# Patient Record
Sex: FEMALE | Race: WHITE | NOT HISPANIC OR LATINO | Employment: FULL TIME | URBAN - METROPOLITAN AREA
[De-identification: names, ages, dates, MRNs, and addresses within clinical notes are randomized per-mention and may not be internally consistent; named-entity substitution may affect disease eponyms.]

---

## 2020-07-02 ENCOUNTER — EH NON-PROVIDER (OUTPATIENT)
Dept: OCCUPATIONAL MEDICINE | Facility: CLINIC | Age: 50
End: 2020-07-02

## 2020-07-02 ENCOUNTER — HOSPITAL ENCOUNTER (OUTPATIENT)
Facility: MEDICAL CENTER | Age: 50
End: 2020-07-02
Attending: NURSE PRACTITIONER
Payer: COMMERCIAL

## 2020-07-02 ENCOUNTER — EMPLOYEE HEALTH (OUTPATIENT)
Dept: OCCUPATIONAL MEDICINE | Facility: CLINIC | Age: 50
End: 2020-07-02

## 2020-07-02 DIAGNOSIS — Z02.89 VISIT FOR OCCUPATIONAL HEALTH EXAMINATION: Primary | ICD-10-CM

## 2020-07-02 DIAGNOSIS — Z02.89 VISIT FOR OCCUPATIONAL HEALTH EXAMINATION: ICD-10-CM

## 2020-07-02 DIAGNOSIS — Z02.1 PRE-EMPLOYMENT HEALTH SCREENING EXAMINATION: ICD-10-CM

## 2020-07-02 LAB
AMP AMPHETAMINE: NORMAL
BAR BARBITURATES: NORMAL
BZO BENZODIAZEPINES: NORMAL
COC COCAINE: NORMAL
INT CON NEG: NORMAL
INT CON POS: NORMAL
MDMA ECSTASY: NORMAL
MET METHAMPHETAMINES: NORMAL
MTD METHADONE: NORMAL
OPI OPIATES: NORMAL
OXY OXYCODONE: NORMAL
PCP PHENCYCLIDINE: NORMAL
POC URINE DRUG SCREEN OCDRS: NORMAL
THC: NORMAL

## 2020-07-02 PROCEDURE — 94375 RESPIRATORY FLOW VOLUME LOOP: CPT | Performed by: NURSE PRACTITIONER

## 2020-07-02 PROCEDURE — 86735 MUMPS ANTIBODY: CPT | Performed by: NURSE PRACTITIONER

## 2020-07-02 PROCEDURE — 86480 TB TEST CELL IMMUN MEASURE: CPT | Performed by: NURSE PRACTITIONER

## 2020-07-02 PROCEDURE — 86762 RUBELLA ANTIBODY: CPT | Performed by: NURSE PRACTITIONER

## 2020-07-02 PROCEDURE — 86787 VARICELLA-ZOSTER ANTIBODY: CPT | Performed by: NURSE PRACTITIONER

## 2020-07-02 PROCEDURE — 86765 RUBEOLA ANTIBODY: CPT | Performed by: NURSE PRACTITIONER

## 2020-07-02 PROCEDURE — 80305 DRUG TEST PRSMV DIR OPT OBS: CPT | Performed by: NURSE PRACTITIONER

## 2020-07-02 PROCEDURE — 8915 PR COMPREHENSIVE PHYSICAL: Performed by: NURSE PRACTITIONER

## 2020-07-03 LAB
MEV IGG SER IA-ACNC: 0.76
MUV IGG SER IA-ACNC: 1.05
RUBV AB SER QL: 65.2 IU/ML
VZV IGG SER IA-ACNC: 0.84

## 2020-07-06 ENCOUNTER — TELEPHONE (OUTPATIENT)
Dept: OCCUPATIONAL MEDICINE | Facility: CLINIC | Age: 50
End: 2020-07-06

## 2020-07-06 LAB
GAMMA INTERFERON BACKGROUND BLD IA-ACNC: 0.63 IU/ML
M TB IFN-G BLD-IMP: NEGATIVE
M TB IFN-G CD4+ BCKGRND COR BLD-ACNC: 0.17 IU/ML
MITOGEN IGNF BCKGRD COR BLD-ACNC: >10 IU/ML
QFT TB2 - NIL TBQ2: -0.02 IU/ML

## 2020-07-06 NOTE — TELEPHONE ENCOUNTER
Phone Number Called: [phone] (home)       Call outcome: Left detailed message for patient. Informed to call back with any additional questions.    Message: patient's tb is still pending results as of right now. Patients VZV was non immune at this time and will be needing the vaccines to complete the process.

## 2020-07-06 NOTE — TELEPHONE ENCOUNTER
Phone Number Called: [phone] (home)       Call outcome: Spoke to patient regarding message below.    Message:    Patient was non immune for her varicella vaccine & is still in need for her Tdap vaccine. I spoke with the patient first thing this morning and she wanted the information on the vaccines itself because HR stated that she can get that information from us. I was able to call her back and let her know that when I tried to send the vaccine information it was not clear and that she can come in and I can let her take a look at the information sheets. That way she can decided how she would like to proceed. She stated that can go ahead and come in for her vaccines tomorrow an apt was set up for her and she stated that she would actually like to get the vaccines on two different days. Her start date is 07/13/2020. I stated to her that I can not guarantee that at this time. But I can go ahead and ask. The apt was left for just the Tdap at this time and I told her I would call her back with the final result. I as able to speak with our provider and she stated that she would ideally like to get them both done at the same time and I did let our supervisor know and she stated that we would follow what the provider says. When I called the patient back to let her know she stated that we did not care about what we were putting in her body and that she would not get a regular reaction but it would causes issues internally. I did let her know that I can not force her to get anything that she would like but I would need to let HR know that she is officially declining the vaccines. She was really pushing to get both vaccines done on different days and I asked her if I can put her on hold while I spoke with the provider and supervisor. Our supervisor let me know that she will take over at this point and bring this up to infection prevention. This was told to the patient and she said this was why she did not want me to go  back and forth asking questions because she would look like she is being difficult. The reason to her wanting to get the vaccines done on different days was for her to be able to take some kinf of cleanse in between vaccines. She also asked me if there was a risk if she no longer had her job and I did let her know that was out of my scope and I could not answer that question at this time. She asked if she still needed to come in tomorrow for her apt and I told her that it was best if she waited for my supervisors phone call tomorrow because she would have options for her.

## 2020-07-07 ENCOUNTER — EH NON-PROVIDER (OUTPATIENT)
Dept: OCCUPATIONAL MEDICINE | Facility: CLINIC | Age: 50
End: 2020-07-07

## 2020-07-07 DIAGNOSIS — Z23 NEED FOR VACCINATION: ICD-10-CM

## 2020-07-07 PROCEDURE — 90715 TDAP VACCINE 7 YRS/> IM: CPT | Performed by: NURSE PRACTITIONER

## 2020-07-07 PROCEDURE — 90716 VAR VACCINE LIVE SUBQ: CPT | Performed by: NURSE PRACTITIONER

## 2020-07-07 NOTE — NON-PROVIDER
Patient wasn't too sure about  getting the vaccines, but said she needed too, so she can get the job. I went over the vaccine Questionnaire where then she had answered yes to the latex allegry and that she had reacted to vaccines in the past. She would specify at first but when I had asked what had happened before, she said she would get flu like symptoms. She went on about how she feels that companies shouldn't make the employees get vaccines and that they should be concerned about their own well being. She stated that she would make a change once she stared working here as an employee. She tolerated the vaccines and asked to stay a little longer here since she was feeling a little concerned. She then left after five minutes after receiving them. She was given water

## 2020-07-08 ENCOUNTER — EH NON-PROVIDER (OUTPATIENT)
Dept: OCCUPATIONAL MEDICINE | Facility: CLINIC | Age: 50
End: 2020-07-08

## 2020-07-28 ENCOUNTER — OCCUPATIONAL MEDICINE (OUTPATIENT)
Dept: URGENT CARE | Facility: CLINIC | Age: 50
End: 2020-07-28
Payer: COMMERCIAL

## 2020-07-28 VITALS
SYSTOLIC BLOOD PRESSURE: 124 MMHG | DIASTOLIC BLOOD PRESSURE: 74 MMHG | TEMPERATURE: 97.4 F | RESPIRATION RATE: 16 BRPM | HEART RATE: 72 BPM | OXYGEN SATURATION: 98 %

## 2020-07-28 DIAGNOSIS — M25.50 ARTHRALGIA, UNSPECIFIED JOINT: ICD-10-CM

## 2020-07-28 DIAGNOSIS — T50.Z95A VACCINE REACTION, INITIAL ENCOUNTER: ICD-10-CM

## 2020-07-28 LAB
AMP AMPHETAMINE: NORMAL
BAR BARBITURATES: NORMAL
BREATH ALCOHOL COMMENT: NORMAL
BZO BENZODIAZEPINES: NORMAL
COC COCAINE: NORMAL
INT CON NEG: NORMAL
INT CON POS: NORMAL
MDMA ECSTASY: NORMAL
MET METHAMPHETAMINES: NORMAL
MTD METHADONE: NORMAL
OPI OPIATES: NORMAL
OXY OXYCODONE: NORMAL
PCP PHENCYCLIDINE: NORMAL
POC BREATHALIZER: 0 PERCENT (ref 0–0.01)
POC URINE DRUG SCREEN OCDRS: NORMAL
THC: NORMAL

## 2020-07-28 PROCEDURE — 80305 DRUG TEST PRSMV DIR OPT OBS: CPT | Mod: 29 | Performed by: NURSE PRACTITIONER

## 2020-07-28 PROCEDURE — 82075 ASSAY OF BREATH ETHANOL: CPT | Mod: 29 | Performed by: NURSE PRACTITIONER

## 2020-07-28 PROCEDURE — 99203 OFFICE O/P NEW LOW 30 MIN: CPT | Mod: 29 | Performed by: NURSE PRACTITIONER

## 2020-07-28 ASSESSMENT — VISUAL ACUITY: OU: 1

## 2020-07-28 ASSESSMENT — ENCOUNTER SYMPTOMS
NEUROLOGICAL NEGATIVE: 1
CONSTITUTIONAL NEGATIVE: 1
FEVER: 0

## 2020-07-28 NOTE — LETTER
[place] [address] [facility] [address] [place] [address]  Phone:  [phone] - Fax:  [phone]   [facility] Progress Report and Disability Certification  Date of Service: 7/28/2020   No Show:  No  Date / Time of Next Visit: 8/4/2020 @ 8:30AM @[facility]    Claim Information   Patient Name: Dana Paniagua  Claim Number:     Employer: [employer]  Date of Injury: 7/7/2020     Insurer / TPA: Workers Choice  ID / SSN:     Occupation: CNA  Diagnosis: Diagnoses of Vaccine reaction, initial encounter and Arthralgia, unspecified joint were pertinent to this visit.    Medical Information   Related to Industrial Injury? Yes    Subjective Complaints:  DOI 7/7/2020. Initial visit.  Patient received MMR and varicella vaccines at her pre-employment physical as a condition for her new job.  After receiving the vaccines, she is experiencing gradually worsening joint pain in both of her knees, her left elbow, and her right wrist.  Also starting to have joint pain in her back.  Walking, standing, and physical activity is difficult due to the pain.  Has previous known allergens to latex, gluten, mercury, and penicillins.   Objective Findings:  Constitutional:       General: She is not in acute distress.     Appearance: She is well-developed. She is not ill-appearing or toxic-appearing.   HENT:      Head: Normocephalic.      Right Ear: External ear normal.      Left Ear: External ear normal.      Nose: Nose normal.   Eyes:      General: Vision grossly intact.      Extraocular Movements: Extraocular movements intact.      Conjunctiva/sclera: Conjunctivae normal.   Neck:      Musculoskeletal: Normal range of motion.   Cardiovascular:      Rate and Rhythm: Normal rate.   Pulmonary:      Effort: Pulmonary effort is normal. No respiratory distress.   Musculoskeletal: Normal range of motion.         General: No deformity.      Left elbow: She exhibits normal range of motion, no swelling  and no deformity. No tenderness found.      Right knee: She exhibits swelling. She exhibits normal range of motion, no ecchymosis, no deformity, no laceration and normal alignment. Tenderness found.      Left knee: She exhibits swelling. She exhibits normal range of motion, no ecchymosis, no deformity, no laceration and normal alignment. Tenderness found.   Skin:     General: Skin is warm and dry.      Coloration: Skin is not pale.      Findings: Bruising (Mild, at right upper arm from vaccine injection) present.   Neurological:      Mental Status: She is alert and oriented to person, place, and time.      Motor: No weakness.      Coordination: Coordination normal.   Psychiatric:         Behavior: Behavior normal. Behavior is cooperative.   Pre-Existing Condition(s):     Assessment:   Initial Visit    Status: Additional Care Required  Permanent Disability:No    Plan:      Diagnostics:      Comments:  Released to restricted duty.  Follow-up in 1 week.  May continue with over-the-counter ibuprofen as needed for pain.  Return sooner if symptoms change or worsen.    Disability Information   Status: Released to Restricted Duty    From:  2020  Through: 2020 Restrictions are: Temporary   Physical Restrictions   Sitting:    Standin hrs/day Stoopin hrs/day Bendin hrs/day   Squattin hrs/day Walkin hrs/day Climbing:    Pushin hrs/day   Pullin hrs/day Other:    Reaching Above Shoulder (L):   Reaching Above Shoulder (R):       Reaching Below Shoulder (L):    Reaching Below Shoulder (R):      Not to exceed Weight Limits   Carrying(hrs):   Weight Limit(lb): < or = to 10 pounds Lifting(hrs):   Weight  Limit(lb): < or = to 10 pounds   Comments:      Repetitive Actions   Hands: i.e. Fine Manipulations from Grasping: < or = to 1 hr/day   Feet: i.e. Operating Foot Controls:     Driving / Operate Machinery:     Provider Name:   AVERY Wiggins Physician Signature:  Physician  Name:     Clinic Name / Location: [address]  [place] [address] Clinic Phone Number: Dept: [phone]   Appointment Time: 9:30 Am Visit Start Time: 10:21 AM   Check-In Time:  9:36 Am Visit Discharge Time:  11:12 AM   Original-Treating Physician or Chiropractor    Page 2-Insurer/TPA    Page 3-Employer    Page 4-Employee

## 2020-07-28 NOTE — LETTER
EMPLOYEE’S CLAIM FOR COMPENSATION/ REPORT OF INITIAL TREATMENT  FORM C-4    EMPLOYEE’S CLAIM - PROVIDE ALL INFORMATION REQUESTED   First Name  Dana Last Name  Arcelia Birthdate                    1970                Sex  female Claim Number   Home Address  [address] Age  49 y.o. 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 (if applicable)  Being Vaccinacted    How did this injury or occupational disease occur? (Be specific an answer in detail. Use additional sheet if necessary)  I was at my employment physical and had the MMR and Varcilla vaccines   If you believe that you have an occupational disease, when did you first have knowledge of the disability and it relationship to your employment?  na Witnesses to the Accident  Yamila      Nature of Injury or Occupational Disease  Inflammation  Part(s) of Body Injured or Affected  Soft Tissue, ,     I certify that the above is true and correct to the best of my knowledge and that I have provided this information in order to obtain the benefits of [place]’s [place] or [place]).  I hereby authorize any physician, chiropractor, surgeon, practitioner, or other person, any hospital, including [facility], any medical service organization, any insurance company, or other institution or organization to release to each other, any medical or other information, including benefits paid or payable, pertinent to this injury or disease, except information relative to diagnosis, treatment and/or counseling for AIDS, psychological conditions, alcohol or controlled substances, for which I must give specific authorization.  A Photostat of this authorization shall be as valid as the original.     Date   Place   Employee’s Signature   THIS REPORT MUST BE COMPLETED AND MAILED WITHIN 3 WORKING DAYS OF TREATMENT   Place  [facility]  Name of [facility]   Date  7/28/2020 Diagnosis  (T50.Z95A) Vaccine reaction, initial encounter  (M25.50) Arthralgia, unspecified joint Is there evidence the injured employee was under the              influence of alcohol and/or another controlled substance at the time of accident?   Hour  10:21 AM Description of Injury or Disease  Diagnoses of Vaccine reaction, initial encounter and Arthralgia, unspecified joint were pertinent to this visit. No   Treatment  Released to restricted duty.  Follow-up in 1 week.  May continue with over-the-counter ibuprofen as needed for pain.  Return sooner if symptoms change or worsen.  Have you advised the patient to remain off work five days or     more? No   X-Ray Findings      If Yes   From Date  To Date      From information given by the employee, together with medical evidence, can you directly connect this injury or occupational disease as job incurred?  Yes If No Full Duty    No Modified Duty  Kendy   Is additional medical care by a physician indicated?  Yes If Modified Duty, Specify any Limitations / Restrictions  Per D39   Do you know of  "any previous injury or disease contributing to this condition or occupational disease?                            No   Date  7/28/2020 Print Doctor’s Name   AVERY Wiggins I certify the employer’s copy of  this form was mailed on:   Address  [address] Insurer’s Use Only     [place]  [address]    Provider’s Tax ID Number  [id number] Telephone  Dept: [phone]      e-DESEAN Pina  Signature:     Degree          ORIGINAL-TREATING PHYSICIAN OR CHIROPRACTOR    PAGE 2-INSURER/TPA    PAGE 3-EMPLOYER    PAGE 4-EMPLOYEE        Form C-4 (rev.10/07)          BRIEF DESCRIPTION OF RIGHTS AND BENEFITS  (Pursuant to [place]RS 616C.050)    Notice of Injury or Occupational Disease (Incident Report Form C-1): If an injury or occupational disease (OD) arises out of and in the course of employment, you must provide written notice to your employer as soon as practicable, but no later than 7 days after the accident or OD. Your employer shall maintain a sufficient supply of the required forms.     Claim for Compensation (Form C-4): If medical treatment is sought, the form C-4 is available at the place of initial treatment. A completed \"Claim for Compensation\" (Form C-4) must be filed within 90 days after an accident or OD. The treating physician or chiropractor must, within 3 working days after treatment, complete and mail to the employer, the employer's insurer and third-party , the Claim for Compensation.     Medical Treatment: If you require medical treatment for your on-the-job injury or OD, you may be required to select a physician or chiropractor from a list provided by your workers’ compensation insurer, if it has contracted with an Organization for Managed Care (MCO) or Preferred Provider Organization (PPO) or providers of health care. If your employer has not entered into a contract with an MCO or PPO, you may select a physician or chiropractor from the " Panel of Physicians and Chiropractors. Any medical costs related to your industrial injury or OD will be paid by your insurer.     Temporary Total Disability (TTD): If your doctor has certified that you are unable to work for a period of at least 5 consecutive days, or 5 cumulative days in a 20-day period, or places restrictions on you that your employer does not accommodate, you may be entitled to TTD compensation.     Temporary Partial Disability (TPD): If the wage you receive upon reemployment is less than the compensation for TTD to which you are entitled, the insurer may be required to pay you TPD compensation to make up the difference. TPD can only be paid for a maximum of 24 months.     Permanent Partial Disability (PPD): When your medical condition is stable and there is an indication of a PPD as a result of your injury or OD, within 30 days, your insurer must arrange for an evaluation by a rating physician or chiropractor to determine the degree of your PPD. The amount of your PPD award depends on the date of injury, the results of the PPD evaluation and your age and wage.     Permanent Total Disability (PTD): If you are medically certified by a treating physician or chiropractor as permanently and totally disabled and have been granted a PTD status by your insurer, you are entitled to receive monthly benefits not to exceed 66 2/3% of your average monthly wage. The amount of your PTD payments is subject to reduction if you previously received a PPD award.     Vocational Rehabilitation Services: You may be eligible for vocational rehabilitation services if you are unable to return to the job due to a permanent physical impairment or permanent restrictions as a result of your injury or occupational disease.     Transportation and Per Emily Reimbursement: You may be eligible for travel expenses and per emily associated with medical treatment.     Reopening: You may be able to reopen your claim if your condition  worsens after claim closure.     Appeal Process: If you disagree with a written determination issued by the insurer or the insurer does not respond to your request, you may appeal to the Department of Administration, , by following the instructions contained in your determination letter. You must appeal the determination within 70 days from the date of the determination letter at [address], [address], [place], [place] [address], or [address], [place], [place] [address]. If you disagree with the  decision, you may appeal to the Department of Administration, . You must file your appeal within 30 days from the date of the  decision letter at [address], [address], [place], [place] [address], or [address] [facility] [address], [place], [place] [address]. If you disagree with a decision of an , you may file a petition for judicial review with the District Court. You must do so within 30 days of the Appeal Officer’s decision. You may be represented by an  at your own expense or you may contact the [facility] for possible representation.     [place]  for Injured Workers (NAIW): If you disagree with a  decision, you may request that NAIW represent you without charge at an  Hearing. For information regarding denial of benefits, you may contact the [facility] at: [address], [address], [place] [address], [phone], or [address] [facility] [address], [place] [address], [phone]     To File a Complaint with the Division: If you wish to file a complaint with the  of the Division of Industrial Relations (DIR), please contact the Workers’ Compensation Section, [address], [facility] [address], [place], [place] [address], telephone (564) [phone], or [address], [place], [place] [address], telephone [phone].     For  assistance with Workers’ Compensation Issues: You may contact the Office of the Governor Consumer Health Assistance, [address] [place] [address], [place] [address], [place] [address], Toll Free 1-946.294.4973, Web site: http://govcha.[facility].nv., E-mail [email or web address].              __________________________________________________________________                              ___________________         Employee Name / Signature                                                                                                                     Date                                                                                                                                                                                       D-2 (rev. 01/20)

## 2020-07-28 NOTE — PROGRESS NOTES
Subjective:     Dana Paniagua is a 49 y.o. female who presents for Other (NEW  DOI 7/7/2020 vaccine reaction)    DOI 7/7/2020. Initial visit.  Patient received MMR and varicella vaccines at her pre-employment physical as a condition for her new job.  After receiving the vaccines, she is experiencing gradually worsening joint pain in both of her knees, her left elbow, and her right wrist.  Also starting to have joint pain in her back.  Walking, standing, and physical activity is difficult due to the pain.  Has previous known allergens to latex, gluten, mercury, and penicillins.    Denies previous known history of arthritis.    Patient was screened prior to rooming and denied COVID-19 diagnosis or contact with a person who has been diagnosed or is suspected to have COVID-19. During this visit, appropriate PPE was worn, hand hygiene was performed, and the patient and any visitors were masked.    PMH: No pertinent past medical history to this problem  MEDS: Medications were reviewed in Epic  ALLERGIES: Allergies were reviewed in Epic  SOCHX: Works as a CNA   FH: No pertinent family history to this problem    Review of Systems   Constitutional: Negative.  Negative for fever.   Musculoskeletal: Positive for joint pain.   Neurological: Negative.    All other systems reviewed and are negative.    Additional details per HPI.      Objective:     /74 (BP Location: Right arm, Patient Position: Sitting, BP Cuff Size: Adult)   Pulse 72   Temp 36.3 °C (97.4 °F) (Temporal)   Resp 16   SpO2 98%     Physical Exam  Vitals signs reviewed.   Constitutional:       General: She is not in acute distress.     Appearance: She is well-developed. She is not ill-appearing or toxic-appearing.   HENT:      Head: Normocephalic.      Right Ear: External ear normal.      Left Ear: External ear normal.      Nose: Nose normal.   Eyes:      General: Vision grossly intact.      Extraocular Movements: Extraocular movements intact.       Conjunctiva/sclera: Conjunctivae normal.   Neck:      Musculoskeletal: Normal range of motion.   Cardiovascular:      Rate and Rhythm: Normal rate.   Pulmonary:      Effort: Pulmonary effort is normal. No respiratory distress.   Musculoskeletal: Normal range of motion.         General: No deformity.      Left elbow: She exhibits normal range of motion, no swelling and no deformity. No tenderness found.      Right knee: She exhibits swelling. She exhibits normal range of motion, no ecchymosis, no deformity, no laceration and normal alignment. Tenderness found.      Left knee: She exhibits swelling. She exhibits normal range of motion, no ecchymosis, no deformity, no laceration and normal alignment. Tenderness found.   Skin:     General: Skin is warm and dry.      Coloration: Skin is not pale.      Findings: Bruising (Mild, at right upper arm from vaccine injection) present.   Neurological:      Mental Status: She is alert and oriented to person, place, and time.      Motor: No weakness.      Coordination: Coordination normal.   Psychiatric:         Behavior: Behavior normal. Behavior is cooperative.       Assessment/Plan:     1. Vaccine reaction, initial encounter  - POCT Breath Alcohol Test  - POCT 11 Panel Urine Drug Screen    2. Arthralgia, unspecified joint  - POCT Breath Alcohol Test  - POCT 11 Panel Urine Drug Screen      Likely vaccine reaction to MMR and/or varicella. Watchful waiting for resolution. Vital signs stable, afebrile, asymptomatic, no acute distress at this time. Patient concerned of follow-up vaccines and wants to avoid further shots. Allergy list updated.    Released to restricted duty.  Follow-up in 1 week.  May continue with over-the-counter ibuprofen as needed for pain.  Return sooner if symptoms change or worsen.    Differential diagnosis, natural history, supportive care, over-the-counter symptom management per 's instructions, close monitoring, and indications for immediate  follow-up discussed.     Patient advised to: Return in about 1 week (around 8/4/2020).    All questions answered. Patient agrees with the plan of care.

## 2020-08-04 ENCOUNTER — OCCUPATIONAL MEDICINE (OUTPATIENT)
Dept: OCCUPATIONAL MEDICINE | Facility: CLINIC | Age: 50
End: 2020-08-04
Payer: COMMERCIAL

## 2020-08-04 ENCOUNTER — APPOINTMENT (OUTPATIENT)
Dept: RADIOLOGY | Facility: IMAGING CENTER | Age: 50
End: 2020-08-04
Attending: PREVENTIVE MEDICINE
Payer: COMMERCIAL

## 2020-08-04 VITALS
HEIGHT: 63 IN | DIASTOLIC BLOOD PRESSURE: 70 MMHG | OXYGEN SATURATION: 98 % | BODY MASS INDEX: 27.82 KG/M2 | TEMPERATURE: 97.4 F | RESPIRATION RATE: 12 BRPM | HEART RATE: 77 BPM | WEIGHT: 157 LBS | SYSTOLIC BLOOD PRESSURE: 122 MMHG

## 2020-08-04 DIAGNOSIS — M25.50 ARTHRALGIA, UNSPECIFIED JOINT: ICD-10-CM

## 2020-08-04 PROCEDURE — 73070 X-RAY EXAM OF ELBOW: CPT | Mod: TC,RT | Performed by: PREVENTIVE MEDICINE

## 2020-08-04 PROCEDURE — 73562 X-RAY EXAM OF KNEE 3: CPT | Mod: TC,LT | Performed by: PREVENTIVE MEDICINE

## 2020-08-04 PROCEDURE — 99202 OFFICE O/P NEW SF 15 MIN: CPT | Performed by: PREVENTIVE MEDICINE

## 2020-08-04 NOTE — PROGRESS NOTES
"Subjective:     Dana Paniagua is a 49 y.o. female who presents for Other (#16)      DOI 7/24/2020: 48 yo female presents for joint pain after vaccine administration. During pre-employment evaluation patient had equivocal titers for Varicella and needed updated Tdap.  Having documentation of these as part of the hospital employment condition, so she received both vaccinations.  She states after about 4 days she started getting pain in her right knee and subsequently had pain extend to the left knee and elbows and into the back.  She states at one point is very difficult to walk and was in extreme pain.  She has done different ozone and holistic medicine things to help reduce symptoms.  She was evaluated in the urgent care last week and put on work restrictions.  She states that currently symptoms are a little bit improved but continues to have \"inflammation\" especially in the right knee.  She states she is able to walk about a block at this point.  She did note that she had mild fever/chills at the beginning but this subsequently resolved with her holistic treatments.  Her main concern at this point is the persistent joint pain which is been almost going on 1 month.    ROS: All systems were reviewed on intake form, form was reviewed and signed. See scanned documents in media. Pertinent positives and negatives included in HPI.    PMH: No pertinent past medical history to this problem  MEDS: Medications were reviewed in Epic  ALLERGIES:   Allergies   Allergen Reactions   • Latex    • Measles & Rubella Vaccines    • Mercury    • Mumps Virus Vaccine Live    • Penicillins    • Varicella Virus Vaccine Live      SOCHX: Works asa CNA at [employer]  FH: No pertinent family history to this problem       Objective:     /70   Pulse 77   Temp 36.3 °C (97.4 °F)   Resp 12   Ht 1.6 m (5' 3\")   Wt 71.2 kg (157 lb)   SpO2 98%   BMI 27.81 kg/m²     Constitutional: Patient is in no acute distress. Appears well-developed and " well-nourished.   HENT: Normocephalic and atraumatic. EOM are normal. No scleral icterus.   Cardiovascular: Normal rate.    Pulmonary/Chest: Effort normal. No respiratory distress.   Neurological: Patient is alert and oriented to person, place, and time.   Skin: Skin is warm and dry.   Psychiatric: Normal mood and affect. Behavior is normal.     X-rays of the left and right elbow: No acute deformity.  No joint effusion/swelling.  No arthritis or other deformity seen  X-rays of the left and right knees: No acute deformity.  No joint effusion.  No arthritis.     Assessment/Plan:       1. Arthralgia, unspecified joint  - DX-KNEE 3 VIEWS RIGHT; Future  - DX-KNEE 3 VIEWS LEFT; Future  - DX-ELBOW-LIMITED 2- LEFT; Future  - DX-ELBOW-LIMITED 2- RIGHT; Future    • Discussed x-ray findings.  No evidence of arthritis or inflammatory arthropathy.  Reassured patient that the symptoms should be temporary and should gradually resolve on their own.  X-rays did not show any permanent arthropathy or bony issues.  • Given  •  that patient was equivocal on varicella titer it can be presumed that she had antibodies from childhood and that one varicella vaccine should be sufficient for immune response.  • Recommend OTC ibuprofen/naproxen for pain  • Gradually increase walking a  • s tolerated  • Restricted duty for 1 week then may return to full duty starting Monday, August 10.  • Released from care  •     Differential diagnosis, natural history, supportive care, and indications for immediate follow-up discussed.

## 2020-08-04 NOTE — LETTER
"   [facility] [place]  [address] [place] [address],   [address] [address]  Phone:  [phone] - Fax:  [phone]   Occupational Health [facility] Progress Report and Disability Certification  Date of Service: 8/4/2020   No Show:  No  Date / Time of Next Visit:  Release from care   Claim Information   Patient Name: Dana Paniagua  Claim Number:     Employer: [employer]  Date of Injury: 7/7/2020     Insurer / TPA: [employer]  ID / SSN:     Occupation: CNA  Diagnosis: The encounter diagnosis was Arthralgia, unspecified joint.    Medical Information   Related to Industrial Injury?   Comments:Indeterminant-symptoms were severe and longer duration than typical vaccine reaction.    Subjective Complaints:  DOI 7/24/2020: 50 yo female presents for joint pain after vaccine administration. During pre-employment evaluation patient had equivocal titers for Varicella and needed updated Tdap.  Having documentation of these as part of the hospital employment condition, so she received both vaccinations.  She states after about 4 days she started getting pain in her right knee and subsequently had pain extend to the left knee and elbows and into the back.  She states at one point is very difficult to walk and was in extreme pain.  She has done different ozone and holistic medicine things to help reduce symptoms.  She was evaluated in the urgent care last week and put on work restrictions.  She states that currently symptoms are a little bit improved but continues to have \"inflammation\" especially in the right knee.  She states she is able to walk about a block at this point.  She did note that she had mild fever/chills at the beginning but this subsequently resolved with her holistic treatments.  Her main concern at this point is the persistent joint pain which is been almost going on 1 month.   Objective Findings: X-rays of the left and right elbow: No acute deformity.  No joint effusion/swelling.  " No arthritis or other deformity seen  X-rays of the left and right knees: No acute deformity.  No joint effusion.  No arthritis.    Pre-Existing Condition(s):     Assessment:   Condition Same    Status: Discharged /  MMI  Permanent Disability:No    Plan:      Diagnostics:      Comments:  Discussed x-ray findings.  No evidence of arthritis or inflammatory arthropathy.  Reassured patient that the symptoms should be temporary and should gradually resolve on their own.  X-rays did not show any permanent arthropathy or bony issues.  Given   that patient was equivocal on varicella titer it can be presumed that she had antibodies from childhood and that one varicella vaccine should be sufficient for immune response.  Recommend OTC ibuprofen/naproxen for pain  Gradually increase walking a  s tolerated  Restricted duty for 1 week then may return to full duty starting Monday, August 10.  Released from care      Disability Information   Status:   Comments:Restrictions in place until August 10, may begin full duty on August 10.    From:  8/4/2020  Through:   Restrictions are: Temporary   Physical Restrictions   Sitting:    Standing:  < or = to 2 hrs/day Stooping:    Bending:      Squatting:    Walking:  < or = to 2 hrs/day Climbing:    Pushing:      Pulling:    Other:    Reaching Above Shoulder (L):   Reaching Above Shoulder (R):       Reaching Below Shoulder (L):    Reaching Below Shoulder (R):      Not to exceed Weight Limits   Carrying(hrs):   Weight Limit(lb): < or = to 25 pounds Lifting(hrs):   Weight  Limit(lb): < or = to 25 pounds   Comments: Seated work 75% of shift.  Allowed to stand/walk as needed for comfort.  These restrictions are in place until Monday, August 10 at which time she may begin full duty.    Repetitive Actions   Hands: i.e. Fine Manipulations from Grasping:     Feet: i.e. Operating Foot Controls:     Driving / Operate Machinery:     Provider Name:   Gabe Thomas D.O. Physician Signature:  Physician  Name:     Clinic Name / Location: [address],   [address]  [place] [address] Clinic Phone Number: Dept: [phone]   Appointment Time: 8:30 Am Visit Start Time: 8:34 AM   Check-In Time:  8:30 Am Visit Discharge Time:  10:04 AM   Original-Treating Physician or Chiropractor    Page 2-Insurer/TPA    Page 3-Employer    Page 4-Employee

## 2020-10-14 ENCOUNTER — HOSPITAL ENCOUNTER (OUTPATIENT)
Facility: MEDICAL CENTER | Age: 50
End: 2020-10-14
Attending: NURSE PRACTITIONER
Payer: COMMERCIAL

## 2020-10-14 ENCOUNTER — EH NON-PROVIDER (OUTPATIENT)
Dept: OCCUPATIONAL MEDICINE | Facility: CLINIC | Age: 50
End: 2020-10-14

## 2020-10-14 DIAGNOSIS — Z11.59 SCREENING FOR VIRAL DISEASE: ICD-10-CM

## 2020-10-14 PROCEDURE — U0003 INFECTIOUS AGENT DETECTION BY NUCLEIC ACID (DNA OR RNA); SEVERE ACUTE RESPIRATORY SYNDROME CORONAVIRUS 2 (SARS-COV-2) (CORONAVIRUS DISEASE [COVID-19]), AMPLIFIED PROBE TECHNIQUE, MAKING USE OF HIGH THROUGHPUT TECHNOLOGIES AS DESCRIBED BY CMS-2020-01-R: HCPCS | Performed by: PREVENTIVE MEDICINE

## 2020-10-15 LAB
COVID ORDER STATUS COVID19: NORMAL
SARS-COV-2 RNA RESP QL NAA+PROBE: NOTDETECTED
SPECIMEN SOURCE: NORMAL

## 2020-11-23 ENCOUNTER — EH NON-PROVIDER (OUTPATIENT)
Dept: OCCUPATIONAL MEDICINE | Facility: CLINIC | Age: 50
End: 2020-11-23

## 2020-11-23 ENCOUNTER — HOSPITAL ENCOUNTER (OUTPATIENT)
Facility: MEDICAL CENTER | Age: 50
End: 2020-11-23
Attending: PREVENTIVE MEDICINE

## 2020-11-23 DIAGNOSIS — Z11.59 ENCOUNTER FOR SCREENING FOR OTHER VIRAL DISEASES: ICD-10-CM

## 2020-11-23 PROCEDURE — C9803 HOPD COVID-19 SPEC COLLECT: HCPCS

## 2020-11-23 PROCEDURE — U0003 INFECTIOUS AGENT DETECTION BY NUCLEIC ACID (DNA OR RNA); SEVERE ACUTE RESPIRATORY SYNDROME CORONAVIRUS 2 (SARS-COV-2) (CORONAVIRUS DISEASE [COVID-19]), AMPLIFIED PROBE TECHNIQUE, MAKING USE OF HIGH THROUGHPUT TECHNOLOGIES AS DESCRIBED BY CMS-2020-01-R: HCPCS | Performed by: PREVENTIVE MEDICINE

## 2020-11-23 PROCEDURE — U0003 INFECTIOUS AGENT DETECTION BY NUCLEIC ACID (DNA OR RNA); SEVERE ACUTE RESPIRATORY SYNDROME CORONAVIRUS 2 (SARS-COV-2) (CORONAVIRUS DISEASE [COVID-19]), AMPLIFIED PROBE TECHNIQUE, MAKING USE OF HIGH THROUGHPUT TECHNOLOGIES AS DESCRIBED BY CMS-2020-01-R: HCPCS

## 2020-12-16 DIAGNOSIS — Z23 NEED FOR VACCINATION: ICD-10-CM
